# Patient Record
Sex: MALE | Race: WHITE | Employment: UNEMPLOYED | URBAN - METROPOLITAN AREA
[De-identification: names, ages, dates, MRNs, and addresses within clinical notes are randomized per-mention and may not be internally consistent; named-entity substitution may affect disease eponyms.]

---

## 2017-01-12 ENCOUNTER — OFFICE VISIT (OUTPATIENT)
Dept: SURGERY | Age: 37
End: 2017-01-12

## 2017-01-12 VITALS
HEIGHT: 66 IN | WEIGHT: 142 LBS | BODY MASS INDEX: 22.82 KG/M2 | DIASTOLIC BLOOD PRESSURE: 86 MMHG | SYSTOLIC BLOOD PRESSURE: 124 MMHG

## 2017-01-12 DIAGNOSIS — K40.90 LEFT INGUINAL HERNIA: Primary | ICD-10-CM

## 2017-01-12 PROCEDURE — 99243 OFF/OP CNSLTJ NEW/EST LOW 30: CPT | Performed by: SURGERY

## 2017-01-17 ENCOUNTER — HOSPITAL ENCOUNTER (OUTPATIENT)
Dept: SURGERY | Age: 37
Discharge: HOME | End: 2017-01-17
Attending: SURGERY | Admitting: SURGERY

## 2017-01-17 VITALS
HEIGHT: 66 IN | TEMPERATURE: 97.3 F | WEIGHT: 142 LBS | SYSTOLIC BLOOD PRESSURE: 121 MMHG | DIASTOLIC BLOOD PRESSURE: 77 MMHG | BODY MASS INDEX: 22.82 KG/M2 | HEART RATE: 62 BPM | RESPIRATION RATE: 20 BRPM | OXYGEN SATURATION: 99 %

## 2017-01-17 PROCEDURE — 49505 PRP I/HERN INIT REDUC >5 YR: CPT | Performed by: SURGERY

## 2017-01-17 RX ORDER — HYDROMORPHONE HCL 110MG/55ML
0.5 PATIENT CONTROLLED ANALGESIA SYRINGE INTRAVENOUS EVERY 5 MIN PRN
Status: DISCONTINUED | OUTPATIENT
Start: 2017-01-17 | End: 2017-01-18 | Stop reason: HOSPADM

## 2017-01-17 RX ORDER — METOCLOPRAMIDE HYDROCHLORIDE 5 MG/ML
10 INJECTION INTRAMUSCULAR; INTRAVENOUS
Status: ACTIVE | OUTPATIENT
Start: 2017-01-17 | End: 2017-01-17

## 2017-01-17 RX ORDER — OXYCODONE HYDROCHLORIDE 5 MG/1
5 TABLET ORAL PRN
Status: ACTIVE | OUTPATIENT
Start: 2017-01-17 | End: 2017-01-17

## 2017-01-17 RX ORDER — DIPHENHYDRAMINE HYDROCHLORIDE 50 MG/ML
12.5 INJECTION INTRAMUSCULAR; INTRAVENOUS
Status: ACTIVE | OUTPATIENT
Start: 2017-01-17 | End: 2017-01-17

## 2017-01-17 RX ORDER — MEPERIDINE HYDROCHLORIDE 25 MG/ML
12.5 INJECTION INTRAMUSCULAR; INTRAVENOUS; SUBCUTANEOUS EVERY 5 MIN PRN
Status: DISCONTINUED | OUTPATIENT
Start: 2017-01-17 | End: 2017-01-18 | Stop reason: HOSPADM

## 2017-01-17 RX ORDER — SODIUM CHLORIDE 0.9 % (FLUSH) 0.9 %
10 SYRINGE (ML) INJECTION EVERY 12 HOURS SCHEDULED
Status: DISCONTINUED | OUTPATIENT
Start: 2017-01-17 | End: 2017-01-18 | Stop reason: HOSPADM

## 2017-01-17 RX ORDER — HYDROMORPHONE HCL 110MG/55ML
0.25 PATIENT CONTROLLED ANALGESIA SYRINGE INTRAVENOUS EVERY 5 MIN PRN
Status: DISCONTINUED | OUTPATIENT
Start: 2017-01-17 | End: 2017-01-18 | Stop reason: HOSPADM

## 2017-01-17 RX ORDER — MIDAZOLAM HYDROCHLORIDE 1 MG/ML
INJECTION INTRAMUSCULAR; INTRAVENOUS
Status: COMPLETED
Start: 2017-01-17 | End: 2017-01-17

## 2017-01-17 RX ORDER — MORPHINE SULFATE 4 MG/ML
1 INJECTION, SOLUTION INTRAMUSCULAR; INTRAVENOUS EVERY 5 MIN PRN
Status: DISCONTINUED | OUTPATIENT
Start: 2017-01-17 | End: 2017-01-18 | Stop reason: HOSPADM

## 2017-01-17 RX ORDER — OXYCODONE HYDROCHLORIDE 5 MG/1
5-10 TABLET ORAL EVERY 4 HOURS PRN
Qty: 40 TABLET | Refills: 0 | Status: SHIPPED | OUTPATIENT
Start: 2017-01-17 | End: 2017-01-31 | Stop reason: ALTCHOICE

## 2017-01-17 RX ORDER — PROMETHAZINE HYDROCHLORIDE 25 MG/ML
6.25 INJECTION, SOLUTION INTRAMUSCULAR; INTRAVENOUS
Status: ACTIVE | OUTPATIENT
Start: 2017-01-17 | End: 2017-01-17

## 2017-01-17 RX ORDER — MIDAZOLAM HYDROCHLORIDE 1 MG/ML
2 INJECTION INTRAMUSCULAR; INTRAVENOUS EVERY 10 MIN PRN
Status: DISCONTINUED | OUTPATIENT
Start: 2017-01-17 | End: 2017-01-18 | Stop reason: HOSPADM

## 2017-01-17 RX ORDER — OXYCODONE HYDROCHLORIDE 5 MG/1
10 TABLET ORAL PRN
Status: ACTIVE | OUTPATIENT
Start: 2017-01-17 | End: 2017-01-17

## 2017-01-17 RX ORDER — HYDRALAZINE HYDROCHLORIDE 20 MG/ML
5 INJECTION INTRAMUSCULAR; INTRAVENOUS EVERY 10 MIN PRN
Status: DISCONTINUED | OUTPATIENT
Start: 2017-01-17 | End: 2017-01-18 | Stop reason: HOSPADM

## 2017-01-17 RX ORDER — SODIUM CHLORIDE, SODIUM LACTATE, POTASSIUM CHLORIDE, CALCIUM CHLORIDE 600; 310; 30; 20 MG/100ML; MG/100ML; MG/100ML; MG/100ML
INJECTION, SOLUTION INTRAVENOUS CONTINUOUS
Status: DISCONTINUED | OUTPATIENT
Start: 2017-01-17 | End: 2017-01-18 | Stop reason: HOSPADM

## 2017-01-17 RX ORDER — LIDOCAINE HYDROCHLORIDE 10 MG/ML
1 INJECTION, SOLUTION EPIDURAL; INFILTRATION; INTRACAUDAL; PERINEURAL
Status: DISPENSED | OUTPATIENT
Start: 2017-01-17 | End: 2017-01-17

## 2017-01-17 RX ORDER — HEPARIN SODIUM 5000 [USP'U]/ML
5000 INJECTION, SOLUTION INTRAVENOUS; SUBCUTANEOUS ONCE
Status: COMPLETED | OUTPATIENT
Start: 2017-01-17 | End: 2017-01-17

## 2017-01-17 RX ORDER — LABETALOL HYDROCHLORIDE 5 MG/ML
5 INJECTION, SOLUTION INTRAVENOUS EVERY 10 MIN PRN
Status: DISCONTINUED | OUTPATIENT
Start: 2017-01-17 | End: 2017-01-18 | Stop reason: HOSPADM

## 2017-01-17 RX ORDER — LORAZEPAM 2 MG/ML
1 INJECTION INTRAMUSCULAR EVERY 10 MIN PRN
Status: DISCONTINUED | OUTPATIENT
Start: 2017-01-17 | End: 2017-01-18 | Stop reason: HOSPADM

## 2017-01-17 RX ORDER — SODIUM CHLORIDE 0.9 % (FLUSH) 0.9 %
10 SYRINGE (ML) INJECTION PRN
Status: DISCONTINUED | OUTPATIENT
Start: 2017-01-17 | End: 2017-01-18 | Stop reason: HOSPADM

## 2017-01-17 RX ADMIN — Medication 0.5 MG: at 13:23

## 2017-01-17 RX ADMIN — HEPARIN SODIUM 5000 UNITS: 5000 INJECTION, SOLUTION INTRAVENOUS; SUBCUTANEOUS at 12:02

## 2017-01-17 RX ADMIN — MIDAZOLAM HYDROCHLORIDE 2 MG: 1 INJECTION INTRAMUSCULAR; INTRAVENOUS at 10:35

## 2017-01-17 ASSESSMENT — PAIN SCALES - GENERAL
PAINLEVEL_OUTOF10: 7
PAINLEVEL_OUTOF10: 4
PAINLEVEL_OUTOF10: 4

## 2017-01-17 ASSESSMENT — PAIN DESCRIPTION - PAIN TYPE
TYPE: SURGICAL PAIN
TYPE: SURGICAL PAIN

## 2017-01-17 ASSESSMENT — PAIN DESCRIPTION - DESCRIPTORS
DESCRIPTORS: ACHING
DESCRIPTORS: ACHING

## 2017-01-17 ASSESSMENT — PAIN DESCRIPTION - PROGRESSION: CLINICAL_PROGRESSION: NOT CHANGED

## 2017-01-17 ASSESSMENT — PAIN - FUNCTIONAL ASSESSMENT: PAIN_FUNCTIONAL_ASSESSMENT: 0-10

## 2017-01-17 ASSESSMENT — PAIN DESCRIPTION - FREQUENCY: FREQUENCY: CONTINUOUS

## 2017-01-17 ASSESSMENT — PAIN DESCRIPTION - LOCATION
LOCATION: GROIN
LOCATION: GROIN

## 2017-01-17 ASSESSMENT — PAIN DESCRIPTION - ORIENTATION
ORIENTATION: LEFT
ORIENTATION: LEFT

## 2017-01-31 ENCOUNTER — OFFICE VISIT (OUTPATIENT)
Dept: SURGERY | Age: 37
End: 2017-01-31

## 2017-01-31 VITALS
WEIGHT: 154 LBS | BODY MASS INDEX: 24.75 KG/M2 | DIASTOLIC BLOOD PRESSURE: 80 MMHG | SYSTOLIC BLOOD PRESSURE: 124 MMHG | HEIGHT: 66 IN

## 2017-01-31 DIAGNOSIS — Z98.890 POST-OPERATIVE STATE: Primary | ICD-10-CM

## 2017-01-31 PROCEDURE — 99024 POSTOP FOLLOW-UP VISIT: CPT | Performed by: SURGERY

## 2017-01-31 RX ORDER — RISPERIDONE 2 MG/1
2 TABLET, FILM COATED ORAL 2 TIMES DAILY
COMMUNITY
End: 2019-11-30 | Stop reason: SDDI

## 2019-03-25 ENCOUNTER — APPOINTMENT (OUTPATIENT)
Dept: CT IMAGING | Age: 39
End: 2019-03-25
Payer: MEDICARE

## 2019-03-25 ENCOUNTER — APPOINTMENT (OUTPATIENT)
Dept: GENERAL RADIOLOGY | Age: 39
End: 2019-03-25
Payer: MEDICARE

## 2019-03-25 ENCOUNTER — HOSPITAL ENCOUNTER (EMERGENCY)
Age: 39
Discharge: HOME | End: 2019-03-25
Attending: EMERGENCY MEDICINE
Payer: MEDICARE

## 2019-03-25 VITALS
WEIGHT: 144 LBS | OXYGEN SATURATION: 100 % | HEIGHT: 66 IN | DIASTOLIC BLOOD PRESSURE: 88 MMHG | BODY MASS INDEX: 23.14 KG/M2 | HEART RATE: 88 BPM | RESPIRATION RATE: 16 BRPM | TEMPERATURE: 97.8 F | SYSTOLIC BLOOD PRESSURE: 134 MMHG

## 2019-03-25 DIAGNOSIS — N20.1 URETEROLITHIASIS: ICD-10-CM

## 2019-03-25 DIAGNOSIS — N23 RENAL COLIC: ICD-10-CM

## 2019-03-25 DIAGNOSIS — N13.2 HYDRONEPHROSIS WITH RENAL AND URETERAL CALCULOUS OBSTRUCTION: Primary | ICD-10-CM

## 2019-03-25 DIAGNOSIS — R11.2 NAUSEA AND VOMITING, INTRACTABILITY OF VOMITING NOT SPECIFIED, UNSPECIFIED VOMITING TYPE: ICD-10-CM

## 2019-03-25 LAB
A/G RATIO: 1.2 (ref 1.1–2.2)
ALBUMIN SERPL-MCNC: 4.4 G/DL (ref 3.4–5)
ALP BLD-CCNC: 71 U/L (ref 40–129)
ALT SERPL-CCNC: 27 U/L (ref 10–40)
ANION GAP SERPL CALCULATED.3IONS-SCNC: 18 MMOL/L (ref 3–16)
AST SERPL-CCNC: 36 U/L (ref 15–37)
BACTERIA: ABNORMAL /HPF
BASOPHILS ABSOLUTE: 0.1 K/UL (ref 0–0.2)
BASOPHILS RELATIVE PERCENT: 1.1 %
BILIRUB SERPL-MCNC: 0.4 MG/DL (ref 0–1)
BILIRUBIN URINE: ABNORMAL
BLOOD, URINE: ABNORMAL
BUN BLDV-MCNC: 9 MG/DL (ref 7–20)
CALCIUM SERPL-MCNC: 9.8 MG/DL (ref 8.3–10.6)
CHLORIDE BLD-SCNC: 99 MMOL/L (ref 99–110)
CLARITY: ABNORMAL
CO2: 22 MMOL/L (ref 21–32)
COLOR: ABNORMAL
CREAT SERPL-MCNC: 1.3 MG/DL (ref 0.9–1.3)
EOSINOPHILS ABSOLUTE: 0.1 K/UL (ref 0–0.6)
EOSINOPHILS RELATIVE PERCENT: 0.6 %
GFR AFRICAN AMERICAN: >60
GFR NON-AFRICAN AMERICAN: >60
GLOBULIN: 3.6 G/DL
GLUCOSE BLD-MCNC: 130 MG/DL (ref 70–99)
GLUCOSE URINE: NEGATIVE MG/DL
HCT VFR BLD CALC: 45.7 % (ref 40.5–52.5)
HEMOGLOBIN: 15.5 G/DL (ref 13.5–17.5)
KETONES, URINE: ABNORMAL MG/DL
LEUKOCYTE ESTERASE, URINE: NEGATIVE
LIPASE: 26 U/L (ref 13–60)
LYMPHOCYTES ABSOLUTE: 1.9 K/UL (ref 1–5.1)
LYMPHOCYTES RELATIVE PERCENT: 18.5 %
MCH RBC QN AUTO: 28.2 PG (ref 26–34)
MCHC RBC AUTO-ENTMCNC: 34 G/DL (ref 31–36)
MCV RBC AUTO: 83.1 FL (ref 80–100)
MICROSCOPIC EXAMINATION: YES
MONOCYTES ABSOLUTE: 0.8 K/UL (ref 0–1.3)
MONOCYTES RELATIVE PERCENT: 7.6 %
MUCUS: ABNORMAL /LPF
NEUTROPHILS ABSOLUTE: 7.4 K/UL (ref 1.7–7.7)
NEUTROPHILS RELATIVE PERCENT: 72.2 %
NITRITE, URINE: NEGATIVE
PDW BLD-RTO: 13.2 % (ref 12.4–15.4)
PH UA: 8 (ref 5–8)
PLATELET # BLD: 304 K/UL (ref 135–450)
PMV BLD AUTO: 7.5 FL (ref 5–10.5)
POTASSIUM SERPL-SCNC: 4.6 MMOL/L (ref 3.5–5.1)
PROTEIN UA: 100 MG/DL
RBC # BLD: 5.5 M/UL (ref 4.2–5.9)
RBC UA: >100 /HPF (ref 0–2)
SODIUM BLD-SCNC: 139 MMOL/L (ref 136–145)
SPECIFIC GRAVITY UA: 1.02 (ref 1–1.03)
TOTAL PROTEIN: 8 G/DL (ref 6.4–8.2)
URINE TYPE: ABNORMAL
UROBILINOGEN, URINE: 1 E.U./DL
WBC # BLD: 10.2 K/UL (ref 4–11)
WBC UA: ABNORMAL /HPF (ref 0–5)

## 2019-03-25 PROCEDURE — 74176 CT ABD & PELVIS W/O CONTRAST: CPT

## 2019-03-25 PROCEDURE — 99284 EMERGENCY DEPT VISIT MOD MDM: CPT

## 2019-03-25 PROCEDURE — 80053 COMPREHEN METABOLIC PANEL: CPT

## 2019-03-25 PROCEDURE — 2580000003 HC RX 258: Performed by: PHYSICIAN ASSISTANT

## 2019-03-25 PROCEDURE — 96375 TX/PRO/DX INJ NEW DRUG ADDON: CPT

## 2019-03-25 PROCEDURE — 85025 COMPLETE CBC W/AUTO DIFF WBC: CPT

## 2019-03-25 PROCEDURE — 6370000000 HC RX 637 (ALT 250 FOR IP): Performed by: PHYSICIAN ASSISTANT

## 2019-03-25 PROCEDURE — 6360000002 HC RX W HCPCS: Performed by: PHYSICIAN ASSISTANT

## 2019-03-25 PROCEDURE — 81001 URINALYSIS AUTO W/SCOPE: CPT

## 2019-03-25 PROCEDURE — 96361 HYDRATE IV INFUSION ADD-ON: CPT

## 2019-03-25 PROCEDURE — 74018 RADEX ABDOMEN 1 VIEW: CPT

## 2019-03-25 PROCEDURE — 83690 ASSAY OF LIPASE: CPT

## 2019-03-25 PROCEDURE — 96374 THER/PROPH/DIAG INJ IV PUSH: CPT

## 2019-03-25 PROCEDURE — 6370000000 HC RX 637 (ALT 250 FOR IP): Performed by: EMERGENCY MEDICINE

## 2019-03-25 RX ORDER — ONDANSETRON 4 MG/1
4 TABLET, ORALLY DISINTEGRATING ORAL EVERY 8 HOURS PRN
Qty: 10 TABLET | Refills: 0 | Status: SHIPPED | OUTPATIENT
Start: 2019-03-25 | End: 2019-11-30 | Stop reason: ALTCHOICE

## 2019-03-25 RX ORDER — IBUPROFEN 600 MG/1
600 TABLET ORAL EVERY 8 HOURS PRN
Qty: 30 TABLET | Refills: 0 | Status: SHIPPED | OUTPATIENT
Start: 2019-03-25 | End: 2019-11-30 | Stop reason: ALTCHOICE

## 2019-03-25 RX ORDER — ONDANSETRON 2 MG/ML
4 INJECTION INTRAMUSCULAR; INTRAVENOUS EVERY 30 MIN PRN
Status: DISCONTINUED | OUTPATIENT
Start: 2019-03-25 | End: 2019-03-25 | Stop reason: HOSPADM

## 2019-03-25 RX ORDER — MORPHINE SULFATE 4 MG/ML
4 INJECTION, SOLUTION INTRAMUSCULAR; INTRAVENOUS ONCE
Status: COMPLETED | OUTPATIENT
Start: 2019-03-25 | End: 2019-03-25

## 2019-03-25 RX ORDER — TAMSULOSIN HYDROCHLORIDE 0.4 MG/1
0.4 CAPSULE ORAL DAILY
Qty: 14 CAPSULE | Refills: 0 | Status: SHIPPED | OUTPATIENT
Start: 2019-03-25 | End: 2019-11-30

## 2019-03-25 RX ORDER — 0.9 % SODIUM CHLORIDE 0.9 %
1000 INTRAVENOUS SOLUTION INTRAVENOUS ONCE
Status: COMPLETED | OUTPATIENT
Start: 2019-03-25 | End: 2019-03-25

## 2019-03-25 RX ORDER — TAMSULOSIN HYDROCHLORIDE 0.4 MG/1
0.4 CAPSULE ORAL DAILY
Status: DISCONTINUED | OUTPATIENT
Start: 2019-03-25 | End: 2019-03-25 | Stop reason: HOSPADM

## 2019-03-25 RX ORDER — OXYCODONE HYDROCHLORIDE AND ACETAMINOPHEN 5; 325 MG/1; MG/1
1 TABLET ORAL ONCE
Status: COMPLETED | OUTPATIENT
Start: 2019-03-25 | End: 2019-03-25

## 2019-03-25 RX ORDER — KETOROLAC TROMETHAMINE 30 MG/ML
30 INJECTION, SOLUTION INTRAMUSCULAR; INTRAVENOUS ONCE
Status: COMPLETED | OUTPATIENT
Start: 2019-03-25 | End: 2019-03-25

## 2019-03-25 RX ADMIN — KETOROLAC TROMETHAMINE 30 MG: 30 INJECTION, SOLUTION INTRAMUSCULAR; INTRAVENOUS at 16:57

## 2019-03-25 RX ADMIN — MORPHINE SULFATE 4 MG: 4 INJECTION INTRAVENOUS at 16:57

## 2019-03-25 RX ADMIN — SODIUM CHLORIDE 1000 ML: 9 INJECTION, SOLUTION INTRAVENOUS at 16:57

## 2019-03-25 RX ADMIN — ONDANSETRON 4 MG: 2 INJECTION INTRAMUSCULAR; INTRAVENOUS at 16:57

## 2019-03-25 RX ADMIN — TAMSULOSIN HYDROCHLORIDE 0.4 MG: 0.4 CAPSULE ORAL at 18:41

## 2019-03-25 RX ADMIN — OXYCODONE AND ACETAMINOPHEN 1 TABLET: 5; 325 TABLET ORAL at 21:30

## 2019-03-25 ASSESSMENT — PAIN DESCRIPTION - PROGRESSION: CLINICAL_PROGRESSION: RAPIDLY IMPROVING

## 2019-03-25 ASSESSMENT — PAIN DESCRIPTION - LOCATION: LOCATION: ABDOMEN

## 2019-03-25 ASSESSMENT — PAIN SCALES - GENERAL
PAINLEVEL_OUTOF10: 10
PAINLEVEL_OUTOF10: 2
PAINLEVEL_OUTOF10: 4
PAINLEVEL_OUTOF10: 10

## 2019-03-25 ASSESSMENT — PAIN DESCRIPTION - DESCRIPTORS: DESCRIPTORS: STABBING;SHOOTING;SHARP

## 2019-03-25 ASSESSMENT — PAIN DESCRIPTION - FREQUENCY: FREQUENCY: CONTINUOUS

## 2019-11-30 ENCOUNTER — HOSPITAL ENCOUNTER (INPATIENT)
Age: 39
LOS: 5 days | Discharge: HOME | DRG: 760 | End: 2019-12-05
Attending: EMERGENCY MEDICINE | Admitting: INTERNAL MEDICINE
Payer: MEDICARE

## 2019-11-30 DIAGNOSIS — F22 PSYCHOSIS, PARANOID (HCC): Primary | ICD-10-CM

## 2019-11-30 LAB
ACETAMINOPHEN LEVEL: <5 UG/ML (ref 10–30)
AMPHETAMINE SCREEN, URINE: POSITIVE
ANION GAP SERPL CALCULATED.3IONS-SCNC: 13 MMOL/L (ref 3–16)
ATYPICAL LYMPHOCYTE RELATIVE PERCENT: 13 % (ref 0–6)
BARBITURATE SCREEN URINE: ABNORMAL
BASOPHILS ABSOLUTE: 0.2 K/UL (ref 0–0.2)
BASOPHILS RELATIVE PERCENT: 3 %
BENZODIAZEPINE SCREEN, URINE: ABNORMAL
BUN BLDV-MCNC: 11 MG/DL (ref 7–20)
CALCIUM SERPL-MCNC: 9.9 MG/DL (ref 8.3–10.6)
CANNABINOID SCREEN URINE: POSITIVE
CHLORIDE BLD-SCNC: 101 MMOL/L (ref 99–110)
CO2: 20 MMOL/L (ref 21–32)
COCAINE METABOLITE SCREEN URINE: ABNORMAL
CREAT SERPL-MCNC: 0.8 MG/DL (ref 0.9–1.3)
EOSINOPHILS ABSOLUTE: 0.1 K/UL (ref 0–0.6)
EOSINOPHILS RELATIVE PERCENT: 1 %
ETHANOL: NORMAL MG/DL (ref 0–0.08)
GFR AFRICAN AMERICAN: >60
GFR NON-AFRICAN AMERICAN: >60
GLUCOSE BLD-MCNC: 133 MG/DL (ref 70–99)
HCT VFR BLD CALC: 44.3 % (ref 40.5–52.5)
HEMATOLOGY PATH CONSULT: YES
HEMOGLOBIN: 15.1 G/DL (ref 13.5–17.5)
LYMPHOCYTES ABSOLUTE: 3.1 K/UL (ref 1–5.1)
LYMPHOCYTES RELATIVE PERCENT: 38 %
Lab: ABNORMAL
MCH RBC QN AUTO: 27.9 PG (ref 26–34)
MCHC RBC AUTO-ENTMCNC: 34.1 G/DL (ref 31–36)
MCV RBC AUTO: 81.9 FL (ref 80–100)
METHADONE SCREEN, URINE: ABNORMAL
MONOCYTES ABSOLUTE: 0.5 K/UL (ref 0–1.3)
MONOCYTES RELATIVE PERCENT: 8 %
NEUTROPHILS ABSOLUTE: 2.2 K/UL (ref 1.7–7.7)
NEUTROPHILS RELATIVE PERCENT: 37 %
OPIATE SCREEN URINE: ABNORMAL
OXYCODONE URINE: ABNORMAL
PDW BLD-RTO: 13.9 % (ref 12.4–15.4)
PH UA: 5
PHENCYCLIDINE SCREEN URINE: ABNORMAL
PLATELET # BLD: 319 K/UL (ref 135–450)
PLATELET SLIDE REVIEW: ADEQUATE
PMV BLD AUTO: 7 FL (ref 5–10.5)
POIKILOCYTES: ABNORMAL
POTASSIUM REFLEX MAGNESIUM: 4.1 MMOL/L (ref 3.5–5.1)
PROPOXYPHENE SCREEN: ABNORMAL
RBC # BLD: 5.41 M/UL (ref 4.2–5.9)
SALICYLATE, SERUM: <0.3 MG/DL (ref 15–30)
SLIDE REVIEW: ABNORMAL
SODIUM BLD-SCNC: 134 MMOL/L (ref 136–145)
WBC # BLD: 6 K/UL (ref 4–11)

## 2019-11-30 PROCEDURE — 1240000000 HC EMOTIONAL WELLNESS R&B

## 2019-11-30 PROCEDURE — G0480 DRUG TEST DEF 1-7 CLASSES: HCPCS

## 2019-11-30 PROCEDURE — 85025 COMPLETE CBC W/AUTO DIFF WBC: CPT

## 2019-11-30 PROCEDURE — 96372 THER/PROPH/DIAG INJ SC/IM: CPT

## 2019-11-30 PROCEDURE — 6370000000 HC RX 637 (ALT 250 FOR IP): Performed by: EMERGENCY MEDICINE

## 2019-11-30 PROCEDURE — 80048 BASIC METABOLIC PNL TOTAL CA: CPT

## 2019-11-30 PROCEDURE — 99284 EMERGENCY DEPT VISIT MOD MDM: CPT

## 2019-11-30 PROCEDURE — 99285 EMERGENCY DEPT VISIT HI MDM: CPT

## 2019-11-30 PROCEDURE — 80307 DRUG TEST PRSMV CHEM ANLYZR: CPT

## 2019-11-30 RX ORDER — NICOTINE 21 MG/24HR
1 PATCH, TRANSDERMAL 24 HOURS TRANSDERMAL DAILY
Status: DISCONTINUED | OUTPATIENT
Start: 2019-11-30 | End: 2019-12-05 | Stop reason: HOSPADM

## 2019-11-30 RX ORDER — ACETAMINOPHEN 325 MG/1
650 TABLET ORAL EVERY 4 HOURS PRN
Status: DISCONTINUED | OUTPATIENT
Start: 2019-11-30 | End: 2019-12-05 | Stop reason: HOSPADM

## 2019-11-30 RX ORDER — OLANZAPINE 10 MG/1
10 TABLET ORAL ONCE
Status: COMPLETED | OUTPATIENT
Start: 2019-11-30 | End: 2019-11-30

## 2019-11-30 RX ADMIN — OLANZAPINE 10 MG: 10 TABLET, FILM COATED ORAL at 15:02

## 2019-11-30 ASSESSMENT — SLEEP AND FATIGUE QUESTIONNAIRES
AVERAGE NUMBER OF SLEEP HOURS: 5
DO YOU USE A SLEEP AID: NO
DO YOU HAVE DIFFICULTY SLEEPING: NO

## 2019-11-30 ASSESSMENT — LIFESTYLE VARIABLES: HISTORY_ALCOHOL_USE: NO

## 2019-12-01 PROCEDURE — 6370000000 HC RX 637 (ALT 250 FOR IP): Performed by: PSYCHIATRY & NEUROLOGY

## 2019-12-01 PROCEDURE — 1240000000 HC EMOTIONAL WELLNESS R&B

## 2019-12-01 PROCEDURE — 99223 1ST HOSP IP/OBS HIGH 75: CPT | Performed by: PSYCHIATRY & NEUROLOGY

## 2019-12-01 RX ORDER — OLANZAPINE 10 MG/1
10 TABLET ORAL ONCE
Status: COMPLETED | OUTPATIENT
Start: 2019-12-01 | End: 2019-12-01

## 2019-12-01 RX ORDER — OLANZAPINE 10 MG/1
10 TABLET ORAL NIGHTLY
Status: DISCONTINUED | OUTPATIENT
Start: 2019-12-02 | End: 2019-12-04

## 2019-12-01 RX ADMIN — OLANZAPINE 10 MG: 10 TABLET, FILM COATED ORAL at 13:55

## 2019-12-01 ASSESSMENT — PAIN SCALES - GENERAL: PAINLEVEL_OUTOF10: 0

## 2019-12-02 LAB — HEMATOLOGY PATH CONSULT: NORMAL

## 2019-12-02 PROCEDURE — 99233 SBSQ HOSP IP/OBS HIGH 50: CPT | Performed by: PSYCHIATRY & NEUROLOGY

## 2019-12-02 PROCEDURE — 6370000000 HC RX 637 (ALT 250 FOR IP): Performed by: PSYCHIATRY & NEUROLOGY

## 2019-12-02 PROCEDURE — 1240000000 HC EMOTIONAL WELLNESS R&B

## 2019-12-02 RX ORDER — LORAZEPAM 2 MG/1
2 TABLET ORAL EVERY 6 HOURS PRN
Status: DISCONTINUED | OUTPATIENT
Start: 2019-12-02 | End: 2019-12-05 | Stop reason: HOSPADM

## 2019-12-02 RX ORDER — OLANZAPINE 10 MG/1
10 TABLET ORAL EVERY 6 HOURS PRN
Status: DISCONTINUED | OUTPATIENT
Start: 2019-12-02 | End: 2019-12-05 | Stop reason: HOSPADM

## 2019-12-02 RX ORDER — OLANZAPINE 10 MG/1
10 INJECTION, POWDER, LYOPHILIZED, FOR SOLUTION INTRAMUSCULAR EVERY 6 HOURS PRN
Status: DISCONTINUED | OUTPATIENT
Start: 2019-12-02 | End: 2019-12-05 | Stop reason: HOSPADM

## 2019-12-02 RX ADMIN — OLANZAPINE 10 MG: 10 TABLET, FILM COATED ORAL at 22:03

## 2019-12-03 PROBLEM — F17.200 TOBACCO DEPENDENCE: Status: ACTIVE | Noted: 2019-12-03

## 2019-12-03 PROBLEM — Z86.79 HISTORY OF ATRIAL FIBRILLATION: Status: ACTIVE | Noted: 2019-12-03

## 2019-12-03 PROBLEM — F12.10 MARIJUANA ABUSE: Status: ACTIVE | Noted: 2019-12-03

## 2019-12-03 PROCEDURE — 1240000000 HC EMOTIONAL WELLNESS R&B

## 2019-12-03 PROCEDURE — 6370000000 HC RX 637 (ALT 250 FOR IP): Performed by: PSYCHIATRY & NEUROLOGY

## 2019-12-03 PROCEDURE — 99233 SBSQ HOSP IP/OBS HIGH 50: CPT | Performed by: PSYCHIATRY & NEUROLOGY

## 2019-12-03 PROCEDURE — 99222 1ST HOSP IP/OBS MODERATE 55: CPT | Performed by: PHYSICIAN ASSISTANT

## 2019-12-03 RX ADMIN — OLANZAPINE 10 MG: 10 TABLET, FILM COATED ORAL at 20:44

## 2019-12-04 PROCEDURE — 6370000000 HC RX 637 (ALT 250 FOR IP): Performed by: PSYCHIATRY & NEUROLOGY

## 2019-12-04 PROCEDURE — 1240000000 HC EMOTIONAL WELLNESS R&B

## 2019-12-04 PROCEDURE — 99233 SBSQ HOSP IP/OBS HIGH 50: CPT | Performed by: PSYCHIATRY & NEUROLOGY

## 2019-12-04 PROCEDURE — 97165 OT EVAL LOW COMPLEX 30 MIN: CPT

## 2019-12-04 PROCEDURE — 97535 SELF CARE MNGMENT TRAINING: CPT

## 2019-12-04 RX ORDER — OLANZAPINE 5 MG/1
5 TABLET ORAL NIGHTLY
Status: DISCONTINUED | OUTPATIENT
Start: 2019-12-04 | End: 2019-12-05 | Stop reason: HOSPADM

## 2019-12-04 RX ADMIN — OLANZAPINE 5 MG: 5 TABLET, FILM COATED ORAL at 21:30

## 2019-12-04 RX ADMIN — NICOTINE POLACRILEX 4 MG: 4 GUM, CHEWING BUCCAL at 19:25

## 2019-12-05 VITALS
OXYGEN SATURATION: 99 % | HEIGHT: 66 IN | BODY MASS INDEX: 22.5 KG/M2 | DIASTOLIC BLOOD PRESSURE: 56 MMHG | RESPIRATION RATE: 14 BRPM | HEART RATE: 73 BPM | WEIGHT: 140 LBS | TEMPERATURE: 98.3 F | SYSTOLIC BLOOD PRESSURE: 122 MMHG

## 2019-12-05 PROCEDURE — 5130000000 HC BRIDGE APPOINTMENT

## 2019-12-05 PROCEDURE — 99239 HOSP IP/OBS DSCHRG MGMT >30: CPT | Performed by: PSYCHIATRY & NEUROLOGY

## 2019-12-05 PROCEDURE — 6370000000 HC RX 637 (ALT 250 FOR IP): Performed by: PSYCHIATRY & NEUROLOGY

## 2019-12-05 RX ADMIN — NICOTINE POLACRILEX 4 MG: 4 GUM, CHEWING BUCCAL at 08:28

## 2020-07-15 PROBLEM — R00.2 PALPITATIONS: Status: ACTIVE | Noted: 2020-07-15

## 2020-09-02 NOTE — PROGRESS NOTES
Yuly 81   CARDIAC EVALUATION NOTE  [phone]      PCP:  Yakelin [address]    Reason for Consultation/Chief Complaint: new patient for palpitations    Subjective   History of Present Illness: Magalys Salcedo is a 36 y.o. patient with a history of migraines, small left sided brain aneursym, schizonphrenia and bipolar affective who presents with palpitations. He reports he has palpitations that have been recurring for 4-5 months. He reports the episodes occur 1-2 times in a day then will not recur for a few months. He reports he has vision changes and a \"weird\" sensation down his arms after he stands up. He feels his eyes quiver occasionally. He feels faint with these symptoms. He reports he was told he had an irregular heart beat in 2000. He had numbness on his left side of his face and hot sharp pain in his chest when he was diagnosed with the brain aneurysm. He smokes. He smokes marijuana occasionally. He denies CP, dizziness, BLE edema, or syncope. Past Medical History:   has a past medical history of Alcohol abuse, Atrial fibrillation ([address] Bipolar affective ([address] Depression, Marijuana abuse, Migraine with aura and without status migrainosus, not intractable, Other disorders of kidney and ureter, and Schizophrenia ([address] Surgical History:   has a past surgical history that includes hernia repair (Left, 01/17/2017). Social History:   reports that he has been smoking cigarettes. He has been smoking about 1.00 pack per day. His smokeless tobacco use includes chew. He reports current drug use. Drug: Marijuana. He reports that he does not drink alcohol. Family History:  family history is not on file. Grandfather with heart disease and DM. Home Medications:  Were reviewed and are listed in nursing record and/or below  Prior to Admission medications    Medication Sig Start Date End Date Taking?  Authorizing Provider   Sertraline HCl (ZOLOFT PO) Take by mouth   Yes Historical Provider, MD   OLANZapine (ZYPREXA PO) Take by mouth   Yes Historical Provider, MD   AMOXICILLIN PO Take by mouth   Yes Historical Provider, MD   paliperidone palmitate ER (INVEGA SUSTENNA) 156 MG/ML SUE IM injection Inject 156 mg into the muscle once for 1 dose 12/27/19 9/3/20 Yes Vicki Mobley MD          Allergies:  Patient has no known allergies. Review of Systems:   A 14 point review of symptoms completed. Pertinent positives identified in the HPI, all other review of symptoms negative as below.       Objective   PHYSICAL EXAM:    Vitals:    09/03/20 1418   BP: 120/86   Pulse: 104   SpO2: 98%    Weight: 168 lb 8 oz (76.4 kg)         General Appearance:  Alert, cooperative, no distress, appears stated age   Head:  Normocephalic, without obvious abnormality, atraumatic   Eyes:  PERRL, conjunctiva/corneas clear   Nose: Nares normal, no drainage or sinus tenderness   Throat: Lips, mucosa, and tongue normal   Neck: Supple, symmetrical, trachea midline, no adenopathy, thyroid: not enlarged, symmetric, no tenderness/mass/nodules, no carotid bruit or JVD   Lungs:   Clear to auscultation bilaterally, respirations unlabored   Chest Wall:  No deformity or tenderness   Heart:  Regular rate and rhythm, S1, S2 normal, no murmur, rub or gallop   Abdomen:   Soft, non-tender, bowel sounds active all four quadrants,  no masses, no organomegaly   Extremities: Extremities normal, atraumatic, no cyanosis or edema   Pulses: 2+ and symmetric   Skin: Skin color, texture, turgor normal, no rashes or lesions   Pysch: Normal mood and affect   Neurologic: Normal gross motor and sensory exam.         Labs   CBC:   Lab Results   Component Value Date    WBC 6.0 11/30/2019    RBC 5.41 11/30/2019    HGB 15.1 11/30/2019    HCT 44.3 11/30/2019    MCV 81.9 11/30/2019    RDW 13.9 11/30/2019     11/30/2019     CMP:  Lab Results   Component Value Date     11/30/2019    K 4.1 11/30/2019     11/30/2019    CO2 20 Eladia Connolly, personally performed the services described in this documentation, as scribed by the above signed scribe in my presence. It is both accurate and complete to my knowledge. I agree with the details independently gathered by the clinical support staff and the scribed note accurately describes my personal service to the patient.

## 2020-09-03 ENCOUNTER — TELEPHONE (OUTPATIENT)
Dept: CARDIOLOGY CLINIC | Age: 40
End: 2020-09-03

## 2020-09-03 ENCOUNTER — OFFICE VISIT (OUTPATIENT)
Dept: CARDIOLOGY CLINIC | Age: 40
End: 2020-09-03
Payer: MEDICARE

## 2020-09-03 VITALS
OXYGEN SATURATION: 98 % | DIASTOLIC BLOOD PRESSURE: 86 MMHG | HEART RATE: 104 BPM | BODY MASS INDEX: 27.08 KG/M2 | WEIGHT: 168.5 LBS | HEIGHT: 66 IN | SYSTOLIC BLOOD PRESSURE: 120 MMHG

## 2020-09-03 PROCEDURE — 4004F PT TOBACCO SCREEN RCVD TLK: CPT | Performed by: INTERNAL MEDICINE

## 2020-09-03 PROCEDURE — G8419 CALC BMI OUT NRM PARAM NOF/U: HCPCS | Performed by: INTERNAL MEDICINE

## 2020-09-03 PROCEDURE — G8427 DOCREV CUR MEDS BY ELIG CLIN: HCPCS | Performed by: INTERNAL MEDICINE

## 2020-09-03 PROCEDURE — 93000 ELECTROCARDIOGRAM COMPLETE: CPT | Performed by: INTERNAL MEDICINE

## 2020-09-03 PROCEDURE — 99204 OFFICE O/P NEW MOD 45 MIN: CPT | Performed by: INTERNAL MEDICINE

## 2020-09-03 NOTE — PATIENT INSTRUCTIONS
1. Echo to evaluate for palpitations, dizziness  2. Stress myoview to evaluate for palpitations, dizziness  3. CT calcium score to evaluate for palpitations  4. 2 week cardiac monitor for palpitations  5. TSH, liver and lipids  6. Discussed possible need for cardiac cath/angiogram if testing abnormal.   7. Follow up with NP in 2 months     Your provider has ordered testing for further evaluation. An order/prescription has been included in your paper work.  To schedule outpatient testing, contact Central Scheduling by calling [address] [place] [address] ([phone]).

## 2020-09-03 NOTE — TELEPHONE ENCOUNTER
Monitor placed by Kaykay Villanueva and [employer] of monitor 14 days  Monitor ordered by Deep Santiago  Serial number [id number]   Kit ID   Activation successful prior to pt leaving office?  Yes

## 2020-09-21 PROCEDURE — 93272 ECG/REVIEW INTERPRET ONLY: CPT | Performed by: INTERNAL MEDICINE

## 2020-09-30 ENCOUNTER — TELEPHONE (OUTPATIENT)
Dept: CARDIOLOGY CLINIC | Age: 40
End: 2020-09-30

## 2020-09-30 NOTE — TELEPHONE ENCOUNTER
LMOVM to return call. Per Dr Mikaela Miranda, cardiac monitor showed brief arrhythmia (VT for 4 beats). Complete other testing then start metoprolol 25 mg BID. Will need referral to EP.

## 2023-03-01 ENCOUNTER — OFFICE VISIT (OUTPATIENT)
Dept: PRIMARY CARE CLINIC | Age: 43
End: 2023-03-01
Payer: COMMERCIAL

## 2023-03-01 VITALS
OXYGEN SATURATION: 97 % | RESPIRATION RATE: 12 BRPM | SYSTOLIC BLOOD PRESSURE: 104 MMHG | WEIGHT: 178.2 LBS | BODY MASS INDEX: 28.76 KG/M2 | TEMPERATURE: 98.8 F | DIASTOLIC BLOOD PRESSURE: 78 MMHG | HEART RATE: 104 BPM

## 2023-03-01 DIAGNOSIS — Z13.220 LIPID SCREENING: ICD-10-CM

## 2023-03-01 DIAGNOSIS — I73.9 CLAUDICATION OF RIGHT LOWER EXTREMITY (HCC): ICD-10-CM

## 2023-03-01 DIAGNOSIS — F41.9 ANXIETY: ICD-10-CM

## 2023-03-01 DIAGNOSIS — Z13.1 DIABETES MELLITUS SCREENING: ICD-10-CM

## 2023-03-01 DIAGNOSIS — Z76.89 ENCOUNTER TO ESTABLISH CARE: Primary | ICD-10-CM

## 2023-03-01 PROCEDURE — 99213 OFFICE O/P EST LOW 20 MIN: CPT

## 2023-03-01 PROCEDURE — 4004F PT TOBACCO SCREEN RCVD TLK: CPT

## 2023-03-01 PROCEDURE — G8427 DOCREV CUR MEDS BY ELIG CLIN: HCPCS

## 2023-03-01 PROCEDURE — G8419 CALC BMI OUT NRM PARAM NOF/U: HCPCS

## 2023-03-01 PROCEDURE — G8484 FLU IMMUNIZE NO ADMIN: HCPCS

## 2023-03-01 ASSESSMENT — PATIENT HEALTH QUESTIONNAIRE - PHQ9
1. LITTLE INTEREST OR PLEASURE IN DOING THINGS: 1
SUM OF ALL RESPONSES TO PHQ QUESTIONS 1-9: 15
SUM OF ALL RESPONSES TO PHQ QUESTIONS 1-9: 15
3. TROUBLE FALLING OR STAYING ASLEEP: 3
10. IF YOU CHECKED OFF ANY PROBLEMS, HOW DIFFICULT HAVE THESE PROBLEMS MADE IT FOR YOU TO DO YOUR WORK, TAKE CARE OF THINGS AT HOME, OR GET ALONG WITH OTHER PEOPLE: 2
4. FEELING TIRED OR HAVING LITTLE ENERGY: 1
9. THOUGHTS THAT YOU WOULD BE BETTER OFF DEAD, OR OF HURTING YOURSELF: 0
5. POOR APPETITE OR OVEREATING: 1
SUM OF ALL RESPONSES TO PHQ QUESTIONS 1-9: 15
SUM OF ALL RESPONSES TO PHQ QUESTIONS 1-9: 15
2. FEELING DOWN, DEPRESSED OR HOPELESS: 2
6. FEELING BAD ABOUT YOURSELF - OR THAT YOU ARE A FAILURE OR HAVE LET YOURSELF OR YOUR FAMILY DOWN: 2
8. MOVING OR SPEAKING SO SLOWLY THAT OTHER PEOPLE COULD HAVE NOTICED. OR THE OPPOSITE, BEING SO FIGETY OR RESTLESS THAT YOU HAVE BEEN MOVING AROUND A LOT MORE THAN USUAL: 2
SUM OF ALL RESPONSES TO PHQ9 QUESTIONS 1 & 2: 3
7. TROUBLE CONCENTRATING ON THINGS, SUCH AS READING THE NEWSPAPER OR WATCHING TELEVISION: 3

## 2023-03-01 ASSESSMENT — ENCOUNTER SYMPTOMS
SHORTNESS OF BREATH: 0
DIARRHEA: 0
VOMITING: 0
NAUSEA: 0
CHEST TIGHTNESS: 0
COUGH: 0
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
## 2024-01-30 ENCOUNTER — OFFICE VISIT (OUTPATIENT)
Dept: PRIMARY CARE CLINIC | Age: 44
End: 2024-01-30
Payer: COMMERCIAL

## 2024-01-30 VITALS
WEIGHT: 180 LBS | BODY MASS INDEX: 29.05 KG/M2 | SYSTOLIC BLOOD PRESSURE: 106 MMHG | OXYGEN SATURATION: 96 % | HEART RATE: 88 BPM | DIASTOLIC BLOOD PRESSURE: 60 MMHG | TEMPERATURE: 98.2 F

## 2024-01-30 DIAGNOSIS — T78.40XA ALLERGIC REACTION, INITIAL ENCOUNTER: Primary | ICD-10-CM

## 2024-01-30 PROCEDURE — 99213 OFFICE O/P EST LOW 20 MIN: CPT

## 2024-01-30 PROCEDURE — G8484 FLU IMMUNIZE NO ADMIN: HCPCS

## 2024-01-30 PROCEDURE — 4004F PT TOBACCO SCREEN RCVD TLK: CPT

## 2024-01-30 PROCEDURE — G8427 DOCREV CUR MEDS BY ELIG CLIN: HCPCS

## 2024-01-30 PROCEDURE — G8419 CALC BMI OUT NRM PARAM NOF/U: HCPCS

## 2024-01-30 RX ORDER — FLUPHENAZINE HYDROCHLORIDE 5 MG/1
5 TABLET ORAL DAILY
COMMUNITY
Start: 2023-11-20

## 2024-01-30 RX ORDER — TRIAMCINOLONE ACETONIDE 1 MG/G
CREAM TOPICAL
Qty: 45 G | Refills: 0 | Status: SHIPPED | OUTPATIENT
Start: 2024-01-30

## 2024-01-30 RX ORDER — EPINEPHRINE 0.3 MG/.3ML
0.3 INJECTION INTRAMUSCULAR ONCE
Qty: 0.3 ML | Refills: 0 | Status: SHIPPED | OUTPATIENT
Start: 2024-01-30 | End: 2024-01-30

## 2024-01-30 RX ORDER — PRAZOSIN HYDROCHLORIDE 5 MG/1
5 CAPSULE ORAL NIGHTLY
COMMUNITY
Start: 2023-11-20

## 2024-01-30 RX ORDER — PREDNISONE 20 MG/1
20 TABLET ORAL 2 TIMES DAILY
Qty: 10 TABLET | Refills: 0 | Status: SHIPPED | OUTPATIENT
Start: 2024-01-30 | End: 2024-02-04

## 2024-01-30 RX ORDER — CETIRIZINE HYDROCHLORIDE 10 MG/1
10 TABLET ORAL DAILY
Qty: 30 TABLET | Refills: 0 | Status: SHIPPED | OUTPATIENT
Start: 2024-01-30 | End: 2024-02-29

## 2024-01-30 RX ORDER — EPINEPHRINE 0.3 MG/.3ML
0.3 INJECTION SUBCUTANEOUS ONCE
Qty: 0.3 ML | Refills: 0 | Status: SHIPPED | OUTPATIENT
Start: 2024-01-30 | End: 2024-01-30

## 2024-01-30 ASSESSMENT — ENCOUNTER SYMPTOMS
VOICE CHANGE: 0
SORE THROAT: 0
COUGH: 0
TROUBLE SWALLOWING: 0
FACIAL SWELLING: 1
SHORTNESS OF BREATH: 0
GASTROINTESTINAL NEGATIVE: 1

## 2024-01-30 NOTE — PATIENT INSTRUCTIONS
Take Zyrtec twice a day for the next few days  Topical cream on areas to help reduce itching (do not use on the face)  Steroids twice a day for 5 days    Can use cool compresses to help with symptom relief.

## 2024-01-30 NOTE — PROGRESS NOTES
[facility]  2024    Frederick Iraheta (:  1980) is a 44 y.o. male, here for evaluation of the following medical concerns:    Chief Complaint   Patient presents with    Allergic Reaction        ASSESSMENT/ PLAN  1. Allergic reaction, initial encounter  - predniSONE (DELTASONE) 20 MG tablet; Take 1 tablet by mouth 2 times daily for 5 days  Dispense: 10 tablet; Refill: 0  - triamcinolone (KENALOG) 0.1 % cream; Apply small amount 2 times daily  Dispense: 45 g; Refill: 0  - cetirizine (ZYRTEC) 10 MG tablet; Take 1 tablet by mouth daily  Dispense: 30 tablet; Refill: 0  - [facility] - Martha Marr MD, Allergy & Immunology, [facility]  - EPINEPHrine (EPIPEN 2-CRUZ) 0.3 MG/0.3ML SOAJ injection; Inject 0.3 mLs into the muscle once for 1 dose Use as directed for allergic reaction  Dispense: 0.3 mL; Refill: 0       - Supportive care measures discussed, as well as red flag symptoms.    Return if symptoms worsen or fail to improve.    HPI  Here today with an allergic reaction.  Thinks that he may be having a reaction to Tide laundry detergent or cheese dip.  Symptoms started over the weekend. Made some cheese dip and ate it with chips. Woke up the next morning with a rash on his arms and abdomen.   Didn't think much of it initially and then ate more of the cheese dip, which worsened his symptoms.  Spread to his eyes and face. Reports that his eyes have been puffy.   Denies any lip or tongue swelling. Feels like he may have bumps on his lips, but no swelling. No difficulty breathing.  The rash is raised and very itchy.  Getting progressively worse. Symptoms are worse today than they were yesterday.    Has been taking Benadryl for his symptoms. Helped slightly with the itching for a short period of time.     Reports that he is very fearful of shots.    Has never had an allergic reaction to anything like this before.  Was told as a child that he was allergic to dairy products. Started drinking

## 2024-07-11 ENCOUNTER — OFFICE VISIT (OUTPATIENT)
Dept: PRIMARY CARE CLINIC | Age: 44
End: 2024-07-11
Payer: COMMERCIAL

## 2024-07-11 VITALS
SYSTOLIC BLOOD PRESSURE: 130 MMHG | RESPIRATION RATE: 14 BRPM | WEIGHT: 181 LBS | OXYGEN SATURATION: 97 % | DIASTOLIC BLOOD PRESSURE: 78 MMHG | HEART RATE: 105 BPM | TEMPERATURE: 98.1 F | BODY MASS INDEX: 29.21 KG/M2

## 2024-07-11 DIAGNOSIS — L23.7 POISON IVY DERMATITIS: Primary | ICD-10-CM

## 2024-07-11 PROCEDURE — G8427 DOCREV CUR MEDS BY ELIG CLIN: HCPCS

## 2024-07-11 PROCEDURE — G8419 CALC BMI OUT NRM PARAM NOF/U: HCPCS

## 2024-07-11 PROCEDURE — 4004F PT TOBACCO SCREEN RCVD TLK: CPT

## 2024-07-11 PROCEDURE — 99213 OFFICE O/P EST LOW 20 MIN: CPT

## 2024-07-11 RX ORDER — PREDNISONE 10 MG/1
TABLET ORAL
Qty: 21 TABLET | Refills: 0 | Status: SHIPPED | OUTPATIENT
Start: 2024-07-11

## 2024-07-11 ASSESSMENT — ENCOUNTER SYMPTOMS
SHORTNESS OF BREATH: 0
COUGH: 0
VOMITING: 0
NAUSEA: 0

## 2024-07-11 NOTE — PROGRESS NOTES
[facility]  2024    Frederick Iraheta (:  1980) is a 44 y.o. male, here for evaluation of the following medical concerns:    Chief Complaint   Patient presents with    Poison Ivy        ASSESSMENT/ PLAN  1. Poison ivy dermatitis  - predniSONE (DELTASONE) 10 MG tablet; 2 tablets 2 times daily for 3 days, 1 tablet 2 times daily for 3 days, 1 tablet daily until gone.  Dispense: 21 tablet; Refill: 0       - He is very fearful of shots. Will treat with oral steroids. Encouraged use of the Zyrtec, cool compresses, etc.  - He has topical Kenalog cream left over from his previous dermatitis flare.    Return if symptoms worsen or fail to improve.    HPI  Here today with poison ivy dermatitis.  Started about 2 days ago after weeding in the neighbor's yard.  First noticed it on his hands and now on his legs. Also on his chest. Woke up feeling itchy on his face and around his eyes.  Waking up scratching his legs.  Generalized itching.    Took some Zyrtec yesterday.  Topical Benadryl. Also has Kenalog cream left over.   Took a cold bath.    Had a reaction back in January. Did well with steroids.  History of frequent skin reactions.      ROS  Review of Systems   Constitutional:  Negative for chills, fatigue and fever.   Respiratory:  Negative for cough and shortness of breath.    Cardiovascular:  Negative for chest pain.   Gastrointestinal:  Negative for nausea and vomiting.   Genitourinary:  Negative for frequency.   Musculoskeletal:  Negative for myalgias.   Skin:  Positive for rash.   Neurological:  Negative for dizziness and weakness.   Psychiatric/Behavioral:  The patient is nervous/anxious.        HISTORIES  Current Outpatient Medications on File Prior to Visit   Medication Sig Dispense Refill    fluPHENAZine HCl (PROLIXIN) 5 MG tablet Take 1 tablet by mouth daily      prazosin (MINIPRESS) 5 MG capsule Take 1 capsule by mouth nightly      Paliperidone Palmitate (INVEGA TRINZA IM) Inject